# Patient Record
Sex: FEMALE | Race: WHITE | Employment: FULL TIME | URBAN - METROPOLITAN AREA
[De-identification: names, ages, dates, MRNs, and addresses within clinical notes are randomized per-mention and may not be internally consistent; named-entity substitution may affect disease eponyms.]

---

## 2021-09-09 ENCOUNTER — HOSPITAL ENCOUNTER (EMERGENCY)
Facility: CLINIC | Age: 41
Discharge: HOME | End: 2021-09-09
Attending: SPECIALIST

## 2021-09-09 ENCOUNTER — APPOINTMENT (OUTPATIENT)
Dept: CT IMAGING | Facility: CLINIC | Age: 41
End: 2021-09-09

## 2021-09-09 VITALS
BODY MASS INDEX: 18.94 KG/M2 | DIASTOLIC BLOOD PRESSURE: 71 MMHG | HEIGHT: 68 IN | RESPIRATION RATE: 16 BRPM | WEIGHT: 125 LBS | TEMPERATURE: 98.5 F | HEART RATE: 71 BPM | SYSTOLIC BLOOD PRESSURE: 123 MMHG | OXYGEN SATURATION: 99 %

## 2021-09-09 DIAGNOSIS — E87.6 HYPOKALEMIA: ICD-10-CM

## 2021-09-09 DIAGNOSIS — R10.31 RIGHT INGUINAL PAIN: Primary | ICD-10-CM

## 2021-09-09 LAB
ABSOLUTE EOS #: 0.1 K/UL (ref 0–0.4)
ABSOLUTE IMMATURE GRANULOCYTE: ABNORMAL K/UL (ref 0–0.3)
ABSOLUTE LYMPH #: 3.5 K/UL (ref 1–4.8)
ABSOLUTE MONO #: 0.9 K/UL (ref 0.1–1.2)
ALBUMIN SERPL-MCNC: 4.9 G/DL (ref 3.5–5.2)
ALBUMIN/GLOBULIN RATIO: 2 (ref 1–2.5)
ALP BLD-CCNC: 55 U/L (ref 35–104)
ALT SERPL-CCNC: 17 U/L (ref 5–33)
ANION GAP SERPL CALCULATED.3IONS-SCNC: 11 MMOL/L (ref 9–17)
AST SERPL-CCNC: 23 U/L
BASOPHILS # BLD: 0 % (ref 0–2)
BASOPHILS ABSOLUTE: 0 K/UL (ref 0–0.2)
BILIRUB SERPL-MCNC: 0.5 MG/DL (ref 0.3–1.2)
BILIRUBIN URINE: NEGATIVE
BUN BLDV-MCNC: 11 MG/DL (ref 6–20)
BUN/CREAT BLD: ABNORMAL (ref 9–20)
CALCIUM SERPL-MCNC: 9.9 MG/DL (ref 8.6–10.4)
CHLORIDE BLD-SCNC: 103 MMOL/L (ref 98–107)
CO2: 28 MMOL/L (ref 20–31)
COLOR: YELLOW
COMMENT UA: NORMAL
CREAT SERPL-MCNC: 0.6 MG/DL (ref 0.5–0.9)
DIFFERENTIAL TYPE: ABNORMAL
EOSINOPHILS RELATIVE PERCENT: 1 % (ref 1–4)
GFR AFRICAN AMERICAN: >60 ML/MIN
GFR NON-AFRICAN AMERICAN: >60 ML/MIN
GFR SERPL CREATININE-BSD FRML MDRD: ABNORMAL ML/MIN/{1.73_M2}
GFR SERPL CREATININE-BSD FRML MDRD: ABNORMAL ML/MIN/{1.73_M2}
GLUCOSE BLD-MCNC: 104 MG/DL (ref 70–99)
GLUCOSE URINE: NEGATIVE
HCT VFR BLD CALC: 38.1 % (ref 36–46)
HEMOGLOBIN: 13.1 G/DL (ref 12–16)
IMMATURE GRANULOCYTES: ABNORMAL %
KETONES, URINE: NEGATIVE
LEUKOCYTE ESTERASE, URINE: NEGATIVE
LYMPHOCYTES # BLD: 46 % (ref 24–44)
MAGNESIUM: 2.1 MG/DL (ref 1.6–2.6)
MCH RBC QN AUTO: 33.3 PG (ref 26–34)
MCHC RBC AUTO-ENTMCNC: 34.3 G/DL (ref 31–37)
MCV RBC AUTO: 97 FL (ref 80–100)
MONOCYTES # BLD: 11 % (ref 2–11)
NITRITE, URINE: NEGATIVE
NRBC AUTOMATED: ABNORMAL PER 100 WBC
PDW BLD-RTO: 13.3 % (ref 12.5–15.4)
PH UA: 6.5 (ref 5–8)
PLATELET # BLD: 336 K/UL (ref 140–450)
PLATELET ESTIMATE: ABNORMAL
PMV BLD AUTO: 6.8 FL (ref 6–12)
POTASSIUM SERPL-SCNC: 3.5 MMOL/L (ref 3.7–5.3)
PROTEIN UA: NEGATIVE
RBC # BLD: 3.93 M/UL (ref 4–5.2)
RBC # BLD: ABNORMAL 10*6/UL
SEG NEUTROPHILS: 42 % (ref 36–66)
SEGMENTED NEUTROPHILS ABSOLUTE COUNT: 3.3 K/UL (ref 1.8–7.7)
SODIUM BLD-SCNC: 142 MMOL/L (ref 135–144)
SPECIFIC GRAVITY UA: 1.02 (ref 1–1.03)
TOTAL PROTEIN: 7.4 G/DL (ref 6.4–8.3)
TURBIDITY: CLEAR
URINE HGB: NEGATIVE
UROBILINOGEN, URINE: NORMAL
WBC # BLD: 7.8 K/UL (ref 3.5–11)
WBC # BLD: ABNORMAL 10*3/UL

## 2021-09-09 PROCEDURE — 85025 COMPLETE CBC W/AUTO DIFF WBC: CPT

## 2021-09-09 PROCEDURE — 6370000000 HC RX 637 (ALT 250 FOR IP)

## 2021-09-09 PROCEDURE — 36415 COLL VENOUS BLD VENIPUNCTURE: CPT

## 2021-09-09 PROCEDURE — 80053 COMPREHEN METABOLIC PANEL: CPT

## 2021-09-09 PROCEDURE — 83735 ASSAY OF MAGNESIUM: CPT

## 2021-09-09 PROCEDURE — 6360000004 HC RX CONTRAST MEDICATION: Performed by: SPECIALIST

## 2021-09-09 PROCEDURE — 74177 CT ABD & PELVIS W/CONTRAST: CPT

## 2021-09-09 PROCEDURE — 2580000003 HC RX 258: Performed by: SPECIALIST

## 2021-09-09 PROCEDURE — 99285 EMERGENCY DEPT VISIT HI MDM: CPT

## 2021-09-09 PROCEDURE — 81003 URINALYSIS AUTO W/O SCOPE: CPT

## 2021-09-09 RX ORDER — 0.9 % SODIUM CHLORIDE 0.9 %
1000 INTRAVENOUS SOLUTION INTRAVENOUS ONCE
Status: COMPLETED | OUTPATIENT
Start: 2021-09-09 | End: 2021-09-09

## 2021-09-09 RX ORDER — POTASSIUM CHLORIDE 20 MEQ/1
TABLET, EXTENDED RELEASE ORAL
Status: COMPLETED
Start: 2021-09-09 | End: 2021-09-09

## 2021-09-09 RX ORDER — SODIUM CHLORIDE 0.9 % (FLUSH) 0.9 %
10 SYRINGE (ML) INJECTION PRN
Status: DISCONTINUED | OUTPATIENT
Start: 2021-09-09 | End: 2021-09-10 | Stop reason: HOSPADM

## 2021-09-09 RX ORDER — 0.9 % SODIUM CHLORIDE 0.9 %
70 INTRAVENOUS SOLUTION INTRAVENOUS ONCE
Status: COMPLETED | OUTPATIENT
Start: 2021-09-09 | End: 2021-09-09

## 2021-09-09 RX ORDER — POTASSIUM CHLORIDE 20 MEQ/1
20 TABLET, EXTENDED RELEASE ORAL ONCE
Status: COMPLETED | OUTPATIENT
Start: 2021-09-09 | End: 2021-09-09

## 2021-09-09 RX ADMIN — Medication 10 ML: at 22:57

## 2021-09-09 RX ADMIN — POTASSIUM CHLORIDE 20 MEQ: 1500 TABLET, EXTENDED RELEASE ORAL at 23:38

## 2021-09-09 RX ADMIN — SODIUM CHLORIDE 70 ML: 9 INJECTION, SOLUTION INTRAVENOUS at 22:55

## 2021-09-09 RX ADMIN — POTASSIUM CHLORIDE 20 MEQ: 20 TABLET, EXTENDED RELEASE ORAL at 23:38

## 2021-09-09 RX ADMIN — IOPAMIDOL 75 ML: 755 INJECTION, SOLUTION INTRAVENOUS at 22:56

## 2021-09-09 RX ADMIN — SODIUM CHLORIDE 1000 ML: 9 INJECTION, SOLUTION INTRAVENOUS at 20:33

## 2021-09-09 ASSESSMENT — PAIN DESCRIPTION - ONSET: ONSET: SUDDEN

## 2021-09-09 ASSESSMENT — PAIN DESCRIPTION - LOCATION: LOCATION: GROIN

## 2021-09-09 ASSESSMENT — PAIN DESCRIPTION - PROGRESSION: CLINICAL_PROGRESSION: NOT CHANGED

## 2021-09-09 ASSESSMENT — PAIN SCALES - GENERAL: PAINLEVEL_OUTOF10: 8

## 2021-09-09 ASSESSMENT — PAIN DESCRIPTION - FREQUENCY: FREQUENCY: CONTINUOUS

## 2021-09-10 NOTE — ED NOTES
Mode of arrival (squad #, walk in, police, etc) : walk in with cousin        Chief complaint(s): groin pain        Arrival Note (brief scenario, treatment PTA, etc). : Pt reports ongoing groin pain, pt reports that this pain is in her right sided groin. Pt reports that she can feel a lump on the right side of her groin. Pt denies vaginal bleeding or trouble urinating. C= \"Have you ever felt that you should Cut down on your drinking? \"  No  A= \"Have people Annoyed you by criticizing your drinking? \"  No  G= \"Have you ever felt bad or Guilty about your drinking? \"  No  E= \"Have you ever had a drink as an Eye-opener first thing in the morning to steady your nerves or to help a hangover? \"  No      Deferred []      Reason for deferring: N/A    *If yes to two or more: probable alcohol abuse. Osei Kong RN  09/09/21 9431

## 2021-09-10 NOTE — ED PROVIDER NOTES
[facility]  [facility]  Phone: [phone]      Pt Name: Swetha Yanes  MRN: [id number]  [place] 1980  Date of evaluation: 9/9/2021      CHIEF COMPLAINT       Chief Complaint   Patient presents with    Groin Pain     Unknown if injured. Started yesterday. States she does feel a small marble sized bump. HISTORY OF PRESENT ILLNESS    Swetha Yanes is a 37 y.o. female who presents   Chief Complaint   Patient presents with    Groin Pain     Unknown if injured. Started yesterday. States she does feel a small marble sized bump. .    71-year-old female patient presents to the emergency department accompanied by her cousin for evaluation of right groin pain off and on for last 5 months, worse since last night. Patient states that she has felt a small marble size lump in the right groin and she is concerned about hernia. 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 She has history of endometriosis and has undergone several pelvic surgeries and finally total abdominal hysterectomy with bilateral salpingo-oophorectomy about 14 years ago. She also has had appendectomy in the past.  She has had nausea and vomiting off and on for last 2weeks with last episode of vomiting 4 to 5 days ago. She denies any constipation or diarrhea and last bowel movement was 2 days ago. She denies any urinary frequency, urgency, dysuria or hematuria and denies any abnormal vaginal discharge. She grades pain as 8 out of 10 in intensity. Pain is worse with the movements and better with rest.    REVIEW OF SYSTEMS       Review of Systems    All systems reviewed and negative unless noted in HPI. The patient denies fever or constitutional symptoms. Denies vision change. Denies any sore throat or rhinorrhea. Denies any neck pain or stiffness. Denies chest pain or shortness of breath. Denies any dysuria.   Denies urinary frequency or hematuria. Denies musculoskeletal injury or pain. Denies any weakness, numbness or focal neurologic deficit. Denies any skin rash or edema. No recent psychiatric issues. No easy bruising or bleeding. Denies any polyuria, polydypsia       PAST MEDICAL HISTORY    has a past medical history of Abscess, Ectopic pregnancy, Endometritis, History of broken nose, and MRSA (methicillin resistant staph aureus) culture positive. SURGICAL HISTORY      has a past surgical history that includes Hysterectomy, total abdominal; Ovary removal (Bilateral); Cervix removal; and Breast lumpectomy (Right). CURRENT MEDICATIONS       Discharge Medication List as of 9/9/2021 11:30 PM      CONTINUE these medications which have NOT CHANGED    Details   fexofenadine (ALLEGRA ALLERGY) 180 MG tablet Take 180 mg by mouth as needed             ALLERGIES     is allergic to bactrim [sulfamethoxazole-trimethoprim] and codeine. FAMILY HISTORY     has no family status information on file. family history is not on file. SOCIAL HISTORY      reports that she has been smoking cigarettes. She has a 20.00 pack-year smoking history. She has never used smokeless tobacco. She reports current alcohol use. She reports previous drug use. Drug: Marijuana. PHYSICAL EXAM     INITIAL VITALS:  height is 5' 8\" (1.727 m) and weight is 56.7 kg (125 lb). Her oral temperature is 98.5 °F (36.9 °C). Her blood pressure is 123/71 and her pulse is 71. Her respiration is 16 and oxygen saturation is 99%. Physical Exam  Vitals and nursing note reviewed. Constitutional:       Appearance: She is well-developed. HENT:      Head: Normocephalic and atraumatic. Nose: Nose normal.   Eyes:      Extraocular Movements: Extraocular movements intact. Pupils: Pupils are equal, round, and reactive to light. Cardiovascular:      Rate and Rhythm: Normal rate and regular rhythm. Heart sounds: Normal heart sounds. No murmur heard. Pulmonary:      Effort: Pulmonary effort is normal. No respiratory distress. Breath sounds: Normal breath sounds. Abdominal:      General: Bowel sounds are normal. There is no distension. Palpations: Abdomen is soft. Tenderness: There is no abdominal tenderness. Hernia: No hernia is present. Comments: Patient has vague tenderness in the right inguinal region. I do not feel any definite inguinal hernia and there is no evidence of lymphadenopathy. Musculoskeletal:      Cervical back: Normal range of motion and neck supple. Skin:     General: Skin is warm and dry. Neurological:      General: No focal deficit present. Mental Status: She is alert and oriented to person, place, and time. DIFFERENTIAL DIAGNOSIS/ MDM:     Right inguinal strain, hernia, bowel obstruction, osteoarthritis    DIAGNOSTIC RESULTS     EKG: All EKG's are interpreted by the Emergency Department Physician who either signs or Co-signs this chart in the absence of a cardiologist.    None obtained    RADIOLOGY:   I reviewed the radiologist interpretations:  CT ABDOMEN PELVIS W IV CONTRAST Additional Contrast? None   Final Result   1. No evidence of obstructive uropathy. 2. No evidence of acute appendicitis. 3. Unremarkable contrast enhanced CT abdomen and pelvis examination. CT ABDOMEN PELVIS W IV CONTRAST Additional Contrast? None    Result Date: 9/9/2021  EXAMINATION: CT OF THE ABDOMEN AND PELVIS WITH CONTRAST 9/9/2021 8:46 pm TECHNIQUE: CT of the abdomen and pelvis was performed with the administration of intravenous contrast. Multiplanar reformatted images are provided for review. Dose modulation, iterative reconstruction, and/or weight based adjustment of the mA/kV was utilized to reduce the radiation dose to as low as reasonably achievable. COMPARISON: None.  HISTORY: ORDERING SYSTEM PROVIDED HISTORY: Right groin pain, nausea, vomiting TECHNOLOGIST PROVIDED HISTORY: Right groin pain, nausea, vomiting Decision Support Exception - unselect if not a suspected or confirmed emergency medical condition->Emergency Medical Condition (MA) Reason for Exam: Right groin pain, nausea, vomiting Acuity: Acute Type of Exam: Initial FINDINGS: Abdomen/Pelvis: Lower chest: The lung bases are well aerated. Pleural surfaces are unremarkable and no evidence of pleural effusion is identified. Organs: The liver, gallbladder, spleen, pancreas, adrenal glands, kidneys, are unremarkable in appearance. GI/Bowel: The stomach is unremarkable without wall thickening or distention. Bowel loops are unremarkable in appearance without evidence of obstruction, distension or mucosal thickening. The appendix is not clearly visualized and no evidence of acute appendicitis is seen. Pelvis: The urinary bladder is well distended and unremarkable in appearance. No evidence of pelvic free fluid is seen. Peritoneum/Retroperitoneum: No evidence of retroperitoneal or intraperitoneal lymphadenopathy is identified. No evidence of intraperitoneal free fluid is seen. Bones/Soft Tissues: The bones, skeletal muscle bundles, fascial planes and subcutaneous soft tissues are unremarkable in appearance. 1. No evidence of obstructive uropathy. 2. No evidence of acute appendicitis. 3. Unremarkable contrast enhanced CT abdomen and pelvis examination.            LABS:  Labs Reviewed   CBC WITH AUTO DIFFERENTIAL - Abnormal; Notable for the following components:       Result Value    RBC 3.93 (*)     Lymphocytes 46 (*)     All other components within normal limits   COMPREHENSIVE METABOLIC PANEL W/ REFLEX TO MG FOR LOW K - Abnormal; Notable for the following components:    Glucose 104 (*)     Potassium 3.5 (*)     All other components within normal limits   URINE RT REFLEX TO CULTURE   MAGNESIUM       Potassium is 3.5, rest of the lab work is unremarkable    EMERGENCY DEPARTMENT COURSE:   Vitals:    Vitals:    09/09/21 1855   BP: 123/71   Pulse: 71   Resp: 16   Temp: 98.5 °F (36.9 °C)   TempSrc: Oral   SpO2: 99%   Weight: 56.7 kg (125 lb)   Height: 5' 8\" (1.727 m)     -------------------------  BP: 123/71, Temp: 98.5 °F (36.9 °C), Pulse: 71, Resp: 16    Orders Placed This Encounter   Medications    0.9 % sodium chloride bolus    0.9 % sodium chloride bolus    DISCONTD: sodium chloride flush 0.9 % injection 10 mL    iopamidol (ISOVUE-370) 76 % injection 75 mL    potassium chloride (KLOR-CON M) extended release tablet 20 mEq    potassium chloride (KLOR-CON M) 20 MEQ extended release tablet     Freeportvalencia Rocha, [facility]: cabinet override       During the emergency department course, patient was given normal saline bolus followed by infusion. She declined any pain medications. She was given potassium chloride 20 mEq orally and then discharged home with instructions drink plenty of oral fluids, take Tylenol and/or ibuprofen as needed for the pain, follow-up with PCP, return if worse. I have reviewed the disposition diagnosis with the patient and or their family/guardian. I have answered their questions and given discharge instructions. They voiced understanding of these instructions and did not have any further questions or complaints. Re-evaluation Notes    Patient is resting comfortably and does not appear to be in any pain or distress. PROCEDURES:  None    FINAL IMPRESSION      1. Right inguinal pain    2.  Hypokalemia          DISPOSITION/PLAN   DISPOSITION Decision To Discharge 09/09/2021 11:29:51 PM      Condition on Disposition    Stable    PATIENT REFERRED TO:  Martin CURIEL [address] [place] [address]  [phone]    Call in 2 days  For reevaluation of current symptoms    [facility]  [address]  [phone]    If symptoms worsen      DISCHARGE MEDICATIONS:  Discharge Medication List as of 9/9/2021 11:30 PM          (Please note that portions of this note were completed with a voice recognition program.  Efforts were made to edit the dictations but occasionally words are mis-transcribed.)    Jose Manuel White MD,, MD, F.A.C.E.P.   Attending Emergency Physician     Jose Manuel White MD  09/10/21 4254

## 2022-11-13 ENCOUNTER — HOSPITAL ENCOUNTER (EMERGENCY)
Facility: CLINIC | Age: 42
Discharge: HOME | End: 2022-11-13
Attending: EMERGENCY MEDICINE

## 2022-11-13 ENCOUNTER — APPOINTMENT (OUTPATIENT)
Dept: GENERAL RADIOLOGY | Facility: CLINIC | Age: 42
End: 2022-11-13

## 2022-11-13 VITALS
HEIGHT: 68 IN | OXYGEN SATURATION: 98 % | TEMPERATURE: 97.5 F | BODY MASS INDEX: 18.94 KG/M2 | WEIGHT: 125 LBS | HEART RATE: 79 BPM | SYSTOLIC BLOOD PRESSURE: 121 MMHG | DIASTOLIC BLOOD PRESSURE: 80 MMHG | RESPIRATION RATE: 15 BRPM

## 2022-11-13 DIAGNOSIS — S80.02XA CONTUSION OF LEFT KNEE, INITIAL ENCOUNTER: Primary | ICD-10-CM

## 2022-11-13 DIAGNOSIS — M70.42 PREPATELLAR BURSITIS OF LEFT KNEE: ICD-10-CM

## 2022-11-13 PROCEDURE — 73562 X-RAY EXAM OF KNEE 3: CPT

## 2022-11-13 PROCEDURE — 99283 EMERGENCY DEPT VISIT LOW MDM: CPT

## 2022-11-13 ASSESSMENT — PAIN DESCRIPTION - FREQUENCY: FREQUENCY: CONTINUOUS

## 2022-11-13 ASSESSMENT — PAIN SCALES - GENERAL: PAINLEVEL_OUTOF10: 6

## 2022-11-13 ASSESSMENT — PAIN DESCRIPTION - ORIENTATION: ORIENTATION: LEFT

## 2022-11-13 ASSESSMENT — PAIN - FUNCTIONAL ASSESSMENT: PAIN_FUNCTIONAL_ASSESSMENT: 0-10

## 2022-11-13 ASSESSMENT — PAIN DESCRIPTION - LOCATION: LOCATION: KNEE

## 2022-11-13 ASSESSMENT — PAIN DESCRIPTION - DESCRIPTORS: DESCRIPTORS: SHARP;ACHING

## 2022-11-13 ASSESSMENT — PAIN DESCRIPTION - PAIN TYPE: TYPE: ACUTE PAIN

## 2022-11-14 NOTE — ED NOTES
Pt presents to the ED via private auto. Pt states she struck her left knee on the door frame 4 days prior.  Pt c/o pain and swelling initially, pain only now     Kareen Garcia RN  11/13/22 2122

## 2022-11-14 NOTE — ED PROVIDER NOTES
[facility]  [facility]  Phone: [phone]      Pt Name: Jus Castellanos  FXA:[id number]  Birthdate 1980  Date of evaluation: 11/13/2022      CHIEF COMPLAINT       Chief Complaint   Patient presents with    Knee Pain     left       HISTORY OF PRESENT ILLNESS   Jus Castellanos is a 40 y.o. female who presents for evaluation of left knee pain. The patient reports that 4 days ago she was stepping over a baby gate when she accidentally smashed her left knee into the corner of the wall. She developed sudden onset of sharp, stabbing, pain to her left anterior knee with dull, achy pain radiating to her left thigh and left lower leg. She states that she has had intermittent swelling to her left anterior knee. She has been taking ibuprofen and applying ice with minimal relief. The patient states that her pain is worse with ambulation but she is able to walk with a limp. She has been wearing a knee brace with some relief. The patient does not list any other provoking or palliating factors. She states that several years ago she had a left foot fracture and had a fall. She had developed left knee swelling at that time and was diagnosed with a left knee joint effusion with possible occult patella fracture. The patient states that at that time she did not require surgery but did follow-up with a orthopedic surgeon at [facility]. She does not remember his name. The patient denies any surgery to the left knee. The patient denies fever, chills, headache, vision changes, neck pain, back pain, chest pain, shortness of breath, abdominal pain, vomiting, urinary/bowel symptoms, focal weakness, numbness, tingling, recent illness.     REVIEW OF SYSTEMS     Ten point review of systems was reviewed and is negative unless otherwise noted in the HPI    [address]    has a past medical history of Abscess, Ectopic pregnancy, Endometritis, History of broken nose, and MRSA (methicillin resistant staph aureus) culture positive. SURGICAL HISTORY      has a past surgical history that includes Hysterectomy, total abdominal; Ovary removal (Bilateral); Cervix removal; and Breast lumpectomy (Right). CURRENT MEDICATIONS       Discharge Medication List as of 11/13/2022 10:10 PM        CONTINUE these medications which have NOT CHANGED    Details   fexofenadine (ALLEGRA ALLERGY) 180 MG tablet Take 180 mg by mouth as needed             ALLERGIES     is allergic to bactrim [sulfamethoxazole-trimethoprim] and codeine. FAMILY HISTORY     has no family status information on file. family history is not on file. SOCIAL HISTORY      reports that she has been smoking cigarettes. She has a 20.00 pack-year smoking history. She has never used smokeless tobacco. She reports current alcohol use. She reports that she does not currently use drugs after having used the following drugs: Marijuana Seabron Barban). PHYSICAL EXAM     INITIAL VITALS:  height is 5' 8\" (1.727 m) and weight is 56.7 kg (125 lb). Her oral temperature is 97.5 °F (36.4 °C). Her blood pressure is 121/80 and her pulse is 79. Her respiration is 15 and oxygen saturation is 98%. CONSTITUTIONAL: no apparent distress, well appearing  SKIN: warm, dry, no jaundice, hives or petechiae  EYES: clear conjunctiva, non-icteric sclera  HENT: normocephalic, atraumatic, moist mucus membranes  NECK: Nontender and supple with no nuchal rigidity, full range of motion  PULMONARY: clear to auscultation without wheezes, rhonchi, or rales, normal excursion, no accessory muscle use and no stridor  CARDIOVASCULAR: regular rate, rhythm. Strong radial pulses with intact distal perfusion. Capillary refill <2 seconds.   GASTROINTESTINAL: soft, non-tender, non-distended, no palpable masses, no rebound or guarding   GENITOURINARY: No costovertebral angle tenderness to palpation  MUSCULOSKELETAL: Tenderness palpation and prepatellar swelling to the left anterior knee without any overlying skin changes, erythema, or ecchymosis. Limited range of motion of the left knee secondary to pain. No ligamentous laxity with medial or lateral strain of the knee bilaterally. Negative anterior and posterior drawer test bilaterally. Negative Lachman's test bilaterally. No palpable Baker cyst.  There is mild pain with manipulation of the left patella. No pain at the hips or ankles. Normal Chavez test.  No pain over the proximal fibular head or the base of the fifth metatarsal. Compartments soft. Strength 5/5 with knee/hip extension and flexion bilaterally. Strength 5/5 with plantar/dorsiflexion of the bilateral feet. DP/PT/Popliteal pulses 2+/4 and equal bilaterally. No midline spinal tenderness, step off or deformity. Extremities are otherwise nontender to palpation and nonerythematous. Compartments soft. No peripheral edema. NEUROLOGIC: alert and oriented x 3, GCS 15, normal mentation and speech. Moves all extremities x 4 without motor or sensory deficit, gait is stable with a slight limp. PSYCHIATRIC: normal mood and affect, thought process is clear and linear    DIAGNOSTIC RESULTS     EKG:  None    RADIOLOGY:   XR KNEE LEFT (3 VIEWS)    Result Date: 11/13/2022  EXAMINATION: FOUR XRAY VIEWS OF THE LEFT KNEE 11/13/2022 9:29 pm COMPARISON: Left knee 01/07/2014 HISTORY: ORDERING SYSTEM PROVIDED HISTORY: left knee pain after striking against corner of wall 4 days ago TECHNOLOGIST PROVIDED HISTORY: left knee pain after striking against corner of wall 4 days ago Reason for Exam: left knee pain after striking against corner of wall 4 days ago FINDINGS: 5 images are presented. Osseous structures of the knee are intact and align normally. No retained radiopaque foreign body. Joint spaces appear well maintained. No acute osseous abnormality evident. Follow-up or additional imaging should be considered if pain persists or worsens.         LABS:  No results found for this visit on 11/13/22. EMERGENCY DEPARTMENT COURSE:        The patient was given the following medications:  No orders of the defined types were placed in this encounter. Vitals:    Vitals:    11/13/22 2119   BP: 121/80   Pulse: 79   Resp: 15   Temp: 97.5 °F (36.4 °C)   TempSrc: Oral   SpO2: 98%   Weight: 56.7 kg (125 lb)   Height: 5' 8\" (1.727 m)     -------------------------  BP: 121/80, Temp: 97.5 °F (36.4 °C), Heart Rate: 79, Resp: 15    CONSULTS:  None    CRITICAL CARE:   None    PROCEDURES:  Splint Application    Date/Time: 11/13/2022 10:32 PM  Performed by: Torie Pena DO  Authorized by: Torie Pean DO     Consent:     Consent obtained:  Verbal    Consent given by:  Patient    Risks, benefits, and alternatives were discussed: yes      Risks discussed:  Discoloration, numbness, pain and swelling    Alternatives discussed:  No treatment, delayed treatment, alternative treatment, observation and referral  Universal protocol:     Procedure explained and questions answered to patient or proxy's satisfaction: yes      Imaging studies available: yes      Patient identity confirmed:  Arm band  Pre-procedure details:     Distal neurologic exam:  Normal    Distal perfusion: distal pulses strong and brisk capillary refill    Procedure details:     Location:  Knee    Knee location:  L knee    Strapping: no      Lower extremity splint type: hinged knee brace. Attestation: Splint applied and adjusted personally by me    Post-procedure details:     Distal neurologic exam:  Normal    Distal perfusion: distal pulses strong and brisk capillary refill      Procedure completion:  Tolerated well, no immediate complications      DIAGNOSIS/ MDM:   Estefanía Masters is a 40 y.o. female who presents with left knee pain. Vital signs are stable. She is neurovascularly intact. The patient has tenderness palpation and prepatellar swelling to the left anterior knee. There is decreased range of motion secondary to pain.   No ligamentous laxity on exam.  X-ray of the left knee shows no acute osseous abnormality. I have low suspicion for fracture or dislocation. I suspect she has prepatellar bursitis and possible soft tissue injury of the left knee. She was placed in a hinged knee brace for comfort. I instructed her to take ibuprofen or Tylenol as needed for pain. She declined any other pain medications. She was instructed to apply ice packs for 20 minutes at a time and elevate her left leg is much as possible. I made her an appointment using 1 click with orthopedic surgery for Wednesday at 10:30 AM based on her choice. I instructed her to follow-up as scheduled and to return to the ER for worsening symptoms or any other concern. The patient understands that at this time there is no evidence for a more malignant underlying process, but also understands that early in the process of an illness or injury, an emergency department work-up can be falsely reassuring. Routine discharge counseling was given, and the patient understands that worsening, changing or persistent symptoms should prompt a immediate call or follow-up with their primary care physician or return to the emergency department. The importance of appropriate follow-up was also discussed. I have reviewed the disposition diagnosis with the patient. I have answered their questions and given discharge instructions. They voiced understanding of these instructions and did not have any further questions or complaints. FINAL IMPRESSION      1. Contusion of left knee, initial encounter    2.  Prepatellar bursitis of left knee          DISPOSITION/PLAN   DISPOSITION Decision To Discharge 11/13/2022 10:00:30 PM        PATIENT REFERRED TO:  Aparna Landers  [address] [facility]  [phone]    Schedule an appointment as soon as possible for a visit in 2 days      [facility]  [address] [place] [address],1 [id number] [phone]  Go to   If symptoms worsen Boykin Burkitt, [address] [place] [address]  [phone]    Go on 11/16/2022  at 10:30am    DISCHARGE MEDICATIONS:  Discharge Medication List as of 11/13/2022 10:10 PM          (Please note that portions of this note were completed with a voice recognitionprogram.  Efforts were made to edit the dictations but occasionally words are mis-transcribed.)    Laurita Horowitz DO, [facility]  Emergency Physician Attending          Laurita Horowitz DO  11/13/22 2829

## 2022-11-16 ENCOUNTER — OFFICE VISIT (OUTPATIENT)
Dept: ORTHOPEDIC SURGERY | Age: 42
End: 2022-11-16

## 2022-11-16 VITALS — WEIGHT: 125 LBS | BODY MASS INDEX: 18.94 KG/M2 | RESPIRATION RATE: 12 BRPM | HEIGHT: 68 IN

## 2022-11-16 DIAGNOSIS — S80.02XA CONTUSION OF LEFT PATELLA, INITIAL ENCOUNTER: Primary | ICD-10-CM

## 2022-11-16 PROCEDURE — 99203 OFFICE O/P NEW LOW 30 MIN: CPT | Performed by: PHYSICIAN ASSISTANT

## 2022-11-16 NOTE — PROGRESS NOTES
[address] AND SPORTS MEDICINE  [address] [facility] [address]  Dept: [phone]  Dept Fax: [phone]          Left Knee - New Patient-ED follow up    Subjective:     Chief Complaint   Patient presents with    ED Follow-up     L Knee Injury, doi 11/9/22       HPI:     Maliha Fierro presents today for Left knee pain. The pain has been present since 11/9/2022. On 11/13/2022 she went to [facility] ED where they did an x-ray and did an ED 1-Click to the orthopedic office. The patient was stepping over a baby gate when she smashed her knee into the corner of the wall. The patient has tried ibuprofen, ice, rest, brace with mild improvement. The pain is now described as  tingling, Achy, and Sharp . There is  pain on weight bearing. The knee has swelled. There is  painful popping and clicking. The knee has not caught or locked up. The knee has not given out. It is  stiff upon arising from sitting. It is  painful to go up and down stairs and sit for a prolonged time. The patient has not had a cortisone injection. The patient has not tried physical therapy. The patient has not had surgery. ROS:   Review of Systems    Past Medical History:    Past Medical History:   Diagnosis Date    Abscess     Ectopic pregnancy     Endometritis     History of broken nose     MRSA (methicillin resistant staph aureus) culture positive        Past Surgical History:    Past Surgical History:   Procedure Laterality Date    BREAST LUMPECTOMY Right     CERVIX REMOVAL      HYSTERECTOMY, TOTAL ABDOMINAL (CERVIX REMOVED)      OVARY REMOVAL Bilateral        CurrentMedications:   Current Outpatient Medications   Medication Sig Dispense Refill    fexofenadine (ALLEGRA ALLERGY) 180 MG tablet Take 180 mg by mouth as needed       No current facility-administered medications for this visit.        Allergies:    Bactrim [sulfamethoxazole-trimethoprim] and Codeine    Social History:   Social History     Socioeconomic History    Marital status: Single     Spouse name: None    Number of children: None    Years of education: None    Highest education level: None   Tobacco Use    Smoking status: Every Day     Packs/day: 1.00     Years: 20.00     Pack years: 20.00     Types: Cigarettes    Smokeless tobacco: Never   Vaping Use    Vaping Use: Never used   Substance and Sexual Activity    Alcohol use: Yes     Alcohol/week: 0.0 standard drinks     Comment:  Socially    Drug use: Not Currently     Types: Marijuana Olivier Vanderbilt)     Comment: Once or twice       Family History:  No family history on file. Vitals:   Resp 12   Ht 5' 8\" (1.727 m)   Wt 125 lb (56.7 kg)   BMI 19.01 kg/m²  Body mass index is 19.01 kg/m². Physical Examination:     Orthopedics:    GENERAL: Alert and oriented X3 in no acute distress. SKIN: Intact without lesions or ulcerations. NEURO: Intact to sensory and motor testing. VASC: Capillary refill is less than 3 seconds. KNEE EXAM    LOCATION: Left Knee  GEN: Alert and oriented X 3, in no acute distress. GAIT: The patient's gait was observed while entering the exam room and was noted to be antalgic. The extremity is in anatomic alignment. SKIN: Intact without rashes, lesions, or ulcerations. No obvious deformity or swelling. NEURO: The patient responds to light touch throughout bilateral LE. Patellar and Achilles reflexes are 2/4. VASC: The bilateral LE is neurovascularly intact with 2/4 DP and 2/4 PT pulses. Brisk capillary refill. ROM: 0/127 degrees. There is no effusion. MUSC: slightly decreased quad tone  LIGAMENT: Lachman's test is Negative with Good endpoint. Anterior drawer Negative. Posterior drawer Negative. There is No varus instability at 0 degrees and No varus instability at 30 degrees. There is No valgus instability at 0 degrees and No valgus instability at 30 degrees.   SPECIAL: Dustin test is negative with no clunks, + crepitation, and + pain. PALP: There is no joint line pain. Patella pain to palpation. Assessment:     1. Contusion of left patella, initial encounter      Procedures:    Procedure: no  Radiology:   XR KNEE LEFT (3 VIEWS)    Result Date: 11/13/2022  EXAMINATION: FOUR XRAY VIEWS OF THE LEFT KNEE 11/13/2022 9:29 pm COMPARISON: Left knee 01/07/2014 HISTORY: ORDERING SYSTEM PROVIDED HISTORY: left knee pain after striking against corner of wall 4 days ago TECHNOLOGIST PROVIDED HISTORY: left knee pain after striking against corner of wall 4 days ago Reason for Exam: left knee pain after striking against corner of wall 4 days ago FINDINGS: 5 images are presented. Osseous structures of the knee are intact and align normally. No retained radiopaque foreign body. Joint spaces appear well maintained. No acute osseous abnormality evident. Follow-up or additional imaging should be considered if pain persists or worsens. Plan:   Treatment : I reviewed the x-ray with the patient and I informed them that the x-ray was negative for any bony abnormalities. We discussed the etiologies and natural histories of patella contusion. We discussed the various treatment alternatives including anti-inflammatory medications, physical therapy, injections, further imaging studies and as a last result surgery. During today's visit, we discussed the patella contusions can be very painful. She does not have a break. It can take several weeks and even months till she can kneel or squat without discomfort. I would like her to follow the NSAID protocol and we will give her a Tubigrip for some compression. I do not think she needs a significant brace. The  patient has opted for going to physical therapy. A physical therapy prescription was given. Patient should return to the clinic in 6 weeks to follow up with Karyl Will PA-C. The patient will call the office immediately with any problems.       No orders of the defined types were placed in this encounter. Orders Placed This Encounter   Procedures    Ambulatory referral to Physical Therapy     Referral Priority:   Routine     Referral Type:   Eval and Treat     Referral Reason:   Specialty Services Required     Requested Specialty:   Physical Therapist     Number of Visits Requested:   1           This note is created with the assistance of a speech recognition program.  While intending to generate a document that actually reflects the content of the visit, the document can still have some errors including those of syntax and sound a like substitutions which may escape proof reading.   In such instances, actual meaning can be extrapolated by contextual diversion    Electronically signed by Alexandra Iglesias PA-C, on 11/16/2022 at 9:01 AM

## 2022-11-22 ENCOUNTER — HOSPITAL ENCOUNTER (OUTPATIENT)
Dept: PHYSICAL THERAPY | Facility: CLINIC | Age: 42
Setting detail: THERAPIES SERIES
Discharge: HOME | End: 2022-11-22

## 2022-11-22 PROCEDURE — 97161 PT EVAL LOW COMPLEX 20 MIN: CPT

## 2022-11-22 PROCEDURE — 97110 THERAPEUTIC EXERCISES: CPT

## 2022-11-22 NOTE — CONSULTS
[] [address]  [address]  [phone]  F: [phone] [] [address]  [address]   [address]  P: [phone]  F: [phone] [] [place]  [address]  P: [phone]  F: [phone] [x] [address]  P: [phone]  F: [phone] [] [address]  [facility]   [address]   [place]: [phone]  F: [phone]      Physical Therapy Lower Extremity Evaluation    Date:  2022  Patient: Donovan Miranda  : 1980  MRN: [id number]  Physician: Ezra Estrada PA-C     Insurance: Hospital Self Pay  Medical Diagnosis: S80.02XA (ICD-10-CM) - Contusion of left patella, initial encounter    Rehab Codes: Left knee pain M25.562  Onset date: 22  Next Dr's appt.: Pending    Subjective:   CC: left knee pain  HPI: The patient was stepping over a baby gate and hit her left patella on the corner of the wall. She had immediate pain and swelling. She has had limited walking and stair tolerance, her pain s not improving because she has had to continue using it a lot. She is visiting from [place] to care for her elderly grandmother. PMHx: [] Unremarkable [] Diabetes [] HTN  [] Pacemaker   [] MI/Heart Problems [] Cancer [x] Arthritis [] Other:              [] Refer to full medical chart  In EPIC       Comorbidities:   [] Obesity [] Dialysis  [] N/A   [] Asthma/COPD [] Dementia [x] Other: Panic attacks   [] Stroke [] Sleep apnea [] Other:   [] Vascular disease [] Rheumatic disease [] Other:     Tests: [x] X-Ray:   FINDINGS:   5 images are presented. Osseous structures of the knee are intact and align   normally. No retained radiopaque foreign body. Joint spaces appear well   maintained.     [] MRI:  [] Other:    Medications: [x] Refer to full medical record [] None [] Other:  Allergies:      [x] Refer to full medical record [] None [] Other:    Function:  Hand Dominance  [x] Right  [] Left  Patient lives with: In what type of home [x]  One story   [] Two story   [] Split level   Number of stairs to enter 2   With handrail on the [x]  Right to enter   [x] Left to enter   Bathroom has a []  Tub only  [x] Tub/shower combo   [] Walk in shower    []  Grab bars   Washing machine is on [x]  Main level   [] Second level   [] Basement   Employer    Job Status []  Normal duty   [] Light duty   [] Off due to condition    []  Retired   [x] Not employed   [] Disability  [] Other:  []  Return to work:    Work activities/duties        ADL/IADL Previous level of function Current level of function Who currently assists the patient with task   Bathing  [x] Independent  [] Assist [x] Independent  [] Assist    Dress/grooming [x] Independent  [] Assist [x] Independent  [] Assist    Transfer/mobility [x] Independent  [] Assist [x] Independent  [] Assist    Feeding [x] Independent  [] Assist [x] Independent  [] Assist    Toileting [x] Independent  [] Assist [x] Independent  [] Assist    Driving [x] Independent  [] Assist [x] Independent  [] Assist    Housekeeping [x] Independent  [] Assist [x] Independent  [] Assist    Grocery shop/meal prep [x] Independent  [] Assist [x] Independent  [] Assist      Gait Prior level of function Current level of function    [x] Independent  [] Assist [x] Independent  [] Assist   Device: [x] Independent [x] Independent    [] Straight Cane [] Quad cane [] Straight Cane [] Quad cane    [] Standard walker [] Rolling walker   [] 4 wheeled walker [] Standard walker [] Rolling walker   [] 4 wheeled walker    [] Wheelchair [] Wheelchair     Pain:  [x] Yes  [] No Location: left knee pain Pain Rating: (0-10 scale) 3/10  Pain altered Tx:  [] Yes  [x] No  Action:    Symptoms:  [] Improving [] Worsening [x] Same  Better:  [] AM    [] PM    [] Sit    [] Rise/Sit    []Stand    [] Walk    [] Lying    [] Other:  Worse: [] AM    [] PM    [x] Sit    [] Rise/Sit    [x]Stand    [x] Walk    [] Lying    [] Bend                      [] Valsalva    [] Other:  Sleep: [] OK    [x] Disturbed    Objective:    ROM  ° A/P STRENGTH TESTS (+/-) Left Right Not Tested    Left Right Left Right Ant. Drawer   [x]   Hip Flex   4+ 4+ Post. Drawer   [x]   Ext   4 4+ Lachmans   [x]   ER     Valgus Stress - - []   IR     Varus Stress - - []   ABD   4 4+ Dustins - - []   ADD     Apleys Comp.    [x]   Knee Flex 120 135 3+ 5 Apleys Dist.   [x]   Ext 5 0 3+ 5 Hip Scouring   [x]   Ankle DF     FELICIANOs   [x]   PF     Piriformis   [x]   INV     Fabs   [x]   EVER     Talor Tilt   [x]        Pat-Fem Grind   [x]       OBSERVATION No Deficit Deficit Not Tested Comments   Posture       Forward Head [x] [] []    Rounded Shoulders [x] [] []    Kyphosis [x] [] []    Lordosis [x] [] []    Lateral Shift [x] [] []    Scoliosis [] [] [x]    Iliac Crest [] [] [x]    PSIS [] [] [x]    ASIS [] [] [x]    Genu Valgus [x] [] []    Genu Varus [x] [] []    Genu Recurvatum [x] [] []    Pronation [] [] [x]    Supination [] [] [x]    Leg Length Discrp [] [] [x]    Slumped Sitting [] [] [x]    Palpation [] [x] [] TTP patella   Sensation [x] [] []    Edema [] [x] []    Neurological [x] [] []    Patellar Mobility [x] [] []    Patellar Orientation [x] [] []    Gait [] [x] [] Analysis: lacks knee flexion with swing phase and avoids full extension in stance phase         FUNCTION Normal Difficult Unable   Sitting [] [x] []   Standing [] [x] []   Ambulation [] [x] []   Groom/Dress [x] [] []   Lift/Carry [] [x] []   Stairs [] [x] []   Bending [] [x] []   Squat [] [x] []   Kneel [] [] []     BALANCE/PROPRIOCEPTION              [x] Not tested   Single leg stance       R                     L                                PAIN   Eyes open Sec.                  Sec                  . []    Eyes closed                          Sec. Sec                  . []        FUNCTIONAL TESTS PAIN NO PAIN COMMENTS   Step Test 4 [x] []    6 [x] []    8 [x] []    Squat [x] []      Functional Test: LEFS Score: 36/80, 55% functionally impaired       Assessment:  The patient is a 40year old with a chief complaint of left knee pain and signs and symptoms consistent with a diagnosis of left patellar contusion. She presents with pain, weakness, decreased range of motion and functional limitations. She will benefit from skilled PT to address above deficits. Problem list, as detailed above:   [x] ? Pain     [x] ? ROM    [x] ? Strength    [x] ? Function:   [x] ? Balance  [x] Edema  [] Postural Deviations  [x] Gait Deviations  [] Other     STG: (to be met in 5 treatments)  ? Pain: 2/10  ? ROM: 0-135:  Patient to be independent with home exercise program as demonstrated by performance with correct form without cues. LTG: (to be met in 10 treatments)  Patient will ascend and descend stairs without increase in pain. Patient will normalize gait. Patient will resume walking program.                     Patient goals: Reduce knee pain    Rehab Potential:  [x] Good  [] Fair  [] Poor   Suggested Professional Referral:  [x] No  [] Yes:  Barriers to Goal Achievement:  [x] No  [] Yes:  Domestic Concerns:  [x] No  [] Yes:    Pt. Education:  [x] Plans/Goals, Risks/Benefits discussed  [x] Home exercise program    Method of Education: [x] Verbal  [x] Demo  [x] Written  Comprehension of Education:  [x] Verbalizes understanding. [] Demonstrates understanding. [] Needs Review. [] Demonstrates/verbalizes understanding of HEP/Ed previously given.     Treatment Plan:  [x] Therapeutic Exercise   40078  [] Iontophoresis: 4 mg/mL Dexamethasone Sodium Phosphate  mAmin  08006   [] Therapeutic Activity  30588 [x] Vasopneumatic cold with compression  10572    [] Gait Training   88282 [] Ultrasound   [id number]   [x] Neuromuscular Re-education  [id number] [] Electrical Stimulation Unattended  27702   [x] Manual Therapy  66790 [] Electrical Stimulation Attended  [id number]   [x] Instruction in HEP  [] Lumbar/Cervical Traction  [id number]   [] Aquatic Therapy   74531 [] Cold/hotpack    [] Massage   40043      [] Dry Needling, 1 or 2 muscles  60654   [] Biofeedback, first 15 minutes   08509  [] Biofeedback, additional 15 minutes   37685 [] Dry Needling, 3 or more muscles  43472       Frequency:  2 x/week for 10 visits        Todays Treatment:  Modalities:  ice pack with elevation 10'  Precautions:  Exercises:  Exercise Reps/ Time Weight/ Level Comments   Quad set 2x10     SLR 10     Hip abduction 2x10     Prone extension x10     Ball Bridge 10     Other:  Access Code: [email or web address]  URL: [email or web address]  Date: 11/22/2022  Prepared by: Mynor Bond    Exercises  Supine Quad Set - 1 x daily - 7 x weekly - 3 sets - 10 reps  Active Straight Leg Raise with Quad Set - 1 x daily - 7 x weekly - 3 sets - 10 reps  Sidelying Hip Abduction - 1 x daily - 7 x weekly - 3 sets - 10 reps  Prone Hip Extension - 1 x daily - 7 x weekly - 3 sets - 10 reps    Specific Instructions for next treatment:      Evaluation Complexity:  History (Personal factors, comorbidities) [] 0 [x] 1-2 [] 3+   Exam (limitations, restrictions) [] 1-2 [x] 3 [] 4+   Clinical presentation (progression) [x] Stable [] Evolving  [] Unstable   Decision Making [x] Low [] Moderate [] High    [x] Low Complexity [] Moderate Complexity [] High Complexity       Treatment Charges: Mins Units   [x] Evaluation       [x]  Low       []  Moderate       []  High 20 1   []  Modalities     [x]  Ther Exercise 20 1   []  Manual Therapy     []  Ther Activities     []  Aquatics     []  Vasocompression     [x]  Other: Cold 10      TOTAL TREATMENT TIME: 50    Time in:1000   Time Out:1050    Electronically signed by: Mynor Bond, PT        Physician Signature:________________________________Date:__________________  By signing above or cosigning this note, I have reviewed this plan of care and certify a need for medically necessary rehabilitation services.      *PLEASE SIGN ABOVE AND FAX BACK ALL PAGES*

## 2022-11-28 ENCOUNTER — HOSPITAL ENCOUNTER (OUTPATIENT)
Dept: PHYSICAL THERAPY | Facility: CLINIC | Age: 42
Setting detail: THERAPIES SERIES
Discharge: HOME | End: 2022-11-28

## 2022-11-28 PROCEDURE — 97016 VASOPNEUMATIC DEVICE THERAPY: CPT

## 2022-11-28 PROCEDURE — 97110 THERAPEUTIC EXERCISES: CPT

## 2022-12-01 ENCOUNTER — HOSPITAL ENCOUNTER (OUTPATIENT)
Dept: PHYSICAL THERAPY | Facility: CLINIC | Age: 42
Setting detail: THERAPIES SERIES
Discharge: HOME | End: 2022-12-01

## 2022-12-01 PROCEDURE — 97110 THERAPEUTIC EXERCISES: CPT

## 2022-12-01 NOTE — FLOWSHEET NOTE
[] [facility]) - [facility]EL[facility] CENTER &  Therapy  [address]  [phone]  F: [phone] [] [address]  [address]   [address]  P: [phone]  F: [phone] [] [address]  Therapy  [address]  P: [phone]  F: [phone] [x] [address]  P: [phone]  F: [phone] [] [address]  [facility]   [address]   [place]: [phone]  F: [phone]      Physical Therapy Daily Treatment Note    Date:  2022  Patient Name:  Estefanía Masters    :  1980  MRN: [id number]  Physician: Rashmi Lee PA-C                                Insurance: Hospital Self Pay  Medical Diagnosis: S80.02XA (ICD-10-CM) - Contusion of left patella, initial encounter                Rehab Codes: Left knee pain M25.562  Onset date: 22             Next Dr's appt.: Pending    Visit# / total visits: 2/10    Cancels/No Shows: 0/0    Subjective:    Pain:  [x] Yes  [] No  Location: L Knee   Pain Rating: (0-10 scale) 0-1/10  Pain altered Tx:  [x] No  [] Yes  Action:  Comments: Pt reports improving pain and gait. She has still chirag doing stairs one at a time, but is moving faster overall.     Objective:  Modalities:    Precautions:none  Exercises:  Exercise Reps/ Time Weight/ Level Comments    Nustep  6' L2     Bike 7' Seat 3  x   Gastroc str 2x30\"   x   HS str  2x30\"      Heel slides 2x10   x   Ball Bridge 2x15   x   Ball flexion  2x15   x   Ball alt SLR 2x10   x          Prone quad str  3x30\"      Quad set 2x10  3\" hold    x   LAQ 2x10  With slight HS curl     SLR x10     x   Hip abduction x10     x   Hip circles 5/5   x   Prone extension x10     x   Bridges 2x15     x   Gym       Heel prop 1'  Seated in chair, heel on stool    Gait training  1 laps    x   Heel pause 20'   x   Toe pause  20'   x   Marching 2x2-'   x   TKE 2x15 Blue  x   Calf raise 2x15   x   TRX squat 10   x   Lateral band walk 20'   x   Other:  HEP:   Access Code: [email or web address]  URL: [email or web address]  Date: 11/28/2022  Prepared by: Gladis Johnson    Exercises  Prone Quad Stretch with Towel Roll and Strap - 1 x daily - 7 x weekly - 1 sets - 3 reps - 30 sec hold  Supine Quad Set - 1 x daily - 7 x weekly - 2-3 sets - 10 reps  Active Straight Leg Raise with Quad Set - 1 x daily - 7 x weekly - 2-3 sets - 10 reps  Sidelying Hip Abduction - 1 x daily - 7 x weekly - 2-3 sets - 10 reps  Prone Hip Extension - 1 x daily - 7 x weekly - 2-3 sets - 10 reps  Seated Long Arc Quad - 1 x daily - 7 x weekly - 2-3 sets - 10 reps  Supine Heel Slide with Strap - 1 x daily - 7 x weekly - 2-3 sets - 10 reps    Heel prop x1' - verbal instruction    Specific Instructions for next treatment:    Treatment Charges: Mins Units   []  Modalities     [x]  Ther Exercise 48 3   []  Manual Therapy     []  Ther Activities     []  Aquatics     []  Vasocompression     []  Other     Total Treatment time 48 3       Assessment: [x] Progressing toward goals. Initiated tx on bike for ROM with good tolerance. Supine, side lying an prone exercises performed with addition of ball exercises and hip circles. Progressed standing exercises with addition of TKE, step up on 4\" step and TRX squat. Improved gait following drills and VC. Will continue with POC and D/C when goals are met. [] No change. [] Other:  [x] Patient would continue to benefit from skilled physical therapy services in order to: The patient is a 40year old with a chief complaint of left knee pain and signs and symptoms consistent with a diagnosis of left patellar contusion. She presents with pain, weakness, decreased range of motion and functional limitations. She will benefit from skilled PT to address above deficits.     STG/LTG  STG: (to be met in 5 treatments)  ? Pain: 2/10. Met  ? ROM: 0-135:  Patient to be independent with home exercise program as demonstrated by performance with correct form without cues. Met     LTG: (to be met in 10 treatments)  Patient will ascend and descend stairs without increase in pain. Patient will normalize gait. Patient will resume walking program.                 Patient goals: Reduce knee pain    Pt. Education:  [x] Yes  [] No  [x] Reviewed Prior HEP/Ed  Method of Education: [x] Verbal  [] Demo  [x] Written  Comprehension of Education:  [x] Verbalizes understanding. [] Demonstrates understanding. [x] Needs review. [] Demonstrates/verbalizes HEP/Ed previously given. Plan: [x] Continue current frequency toward long and short term goals.     [] Specific Instructions for subsequent treatments:     Frequency:  2 x/week for 10 visits      Time In:10:00 am            Time Out: 10:50 am    Electronically signed by:  Rigo Stark PT

## 2022-12-05 ENCOUNTER — HOSPITAL ENCOUNTER (OUTPATIENT)
Dept: PHYSICAL THERAPY | Facility: CLINIC | Age: 42
Setting detail: THERAPIES SERIES
Discharge: HOME | End: 2022-12-05

## 2022-12-05 PROCEDURE — 97016 VASOPNEUMATIC DEVICE THERAPY: CPT

## 2022-12-05 PROCEDURE — 97110 THERAPEUTIC EXERCISES: CPT

## 2022-12-05 NOTE — FLOWSHEET NOTE
[] [facility]) - [facility] CENTER &  Therapy  [address]  [phone]  F: [phone] [] [address]  [address]   [address]  P: [phone]  F: [phone] [] [place]  [facility]  [address]  P: [phone]  F: [phone] [x] [address]  P: [phone]  F: [phone] [] [address]  [facility]   [address]   [place]: [phone]  F: [phone]      Physical Therapy Daily Treatment Note    Date:  2022  Patient Name:  Raj Black    :  1980  MRN: [id number]  Physician: Rolan Crawley PA-C                                Insurance: Hospital Self Pay  Medical Diagnosis: S80.02XA (ICD-10-CM) - Contusion of left patella, initial encounter                Rehab Codes: Left knee pain M25.562  Onset date: 22             Next Dr's appt.: Pending    Visit# / total visits: 4/10    Cancels/No Shows: 0/0    Subjective:    Pain:  [x] Yes  [] No  Location: L Knee   Pain Rating: (0-10 scale) 2/10  Pain altered Tx:  [x] No  [] Yes  Action:  Comments: Pt reports that she had to use the stairs in a parking garage after a concert over the weekend (22) going 4 stories down and up which caused increased pain and soreness in L knee that night and through the rest of the weekend. Pt arrives with 2/10 soreness, pointing to medial and lateral aspects of L knee.        Objective:  Modalities:    Precautions:none  Exercises:  Exercise Reps/ Time Weight/ Level Comments    Bike 8' Seat 3  x   Gastroc str 2x30\"   x   HS str  2x30\"   x   Heel slides 15x   x   Ball Bridge 2x15   x   Ball flexion  2x15   x   Ball alt SLR 2x10   x          Prone quad str  3x30\"      Prone Quad set 15x  3\" hold    x   LAQ 2x10  With slight HS curl -   SLR x10     -   Hip abduction x10     x   Hip circles 2x5 ea    x   Prone extension x10     x   Bridges 2x15     -   Gym       Heel prop 1'  Seated in chair, heel on stool    Total Gym squats- DL  2x10 L13  x   Gait training  1 laps    x   Heel pause 20'   x   Toe pause  20'   x   Marching 20'   x   TKE 2x15 Blue  -   Calf raise 2x15   x   TRX squat 2x10   x   Lateral band walk 20' Terry   -   Other:   11/22/22 12/5/22   L knee PROM  5-120 0-125; lacking 4 deg ext at rest      HEP:   Access Code: [email or web address]  URL: [email or web address]  Date: 11/28/2022  Prepared by: Gely Andres    Exercises  Prone Quad Stretch with Towel Roll and Strap - 1 x daily - 7 x weekly - 1 sets - 3 reps - 30 sec hold  Supine Quad Set - 1 x daily - 7 x weekly - 2-3 sets - 10 reps  Active Straight Leg Raise with Quad Set - 1 x daily - 7 x weekly - 2-3 sets - 10 reps  Sidelying Hip Abduction - 1 x daily - 7 x weekly - 2-3 sets - 10 reps  Prone Hip Extension - 1 x daily - 7 x weekly - 2-3 sets - 10 reps  Seated Long Arc Quad - 1 x daily - 7 x weekly - 2-3 sets - 10 reps  Supine Heel Slide with Strap - 1 x daily - 7 x weekly - 2-3 sets - 10 reps    Heel prop x1' - verbal instruction    Specific Instructions for next treatment:    Treatment Charges: Mins Units   []  Modalities     [x]  Ther Exercise 50 3   []  Manual Therapy     []  Ther Activities     []  Aquatics     [x]  Vasocompression 10 1   []  Other     Total Treatment time 48 3       Assessment: [x] Progressing toward goals. Pt demos improving PROM of L knee 0-125 deg, however pt cont to lack approx 4 deg ext without overpressure. Pt continued with mat exercises with fair tolerance, pt demos difficulty with quad sets d/t pain in posterior knee. Improved julia with prone quad sets. Added DL squats on total gym with good tolerance. Pt reports more \"pressure\" in L knee with total gym squats and TRX squats, but able to complete.  Pt dmeos improved tolerance and balance with gait training drills. Ended with vaso for pain management. [] No change. [] Other:  [x] Patient would continue to benefit from skilled physical therapy services in order to: The patient is a 40year old with a chief complaint of left knee pain and signs and symptoms consistent with a diagnosis of left patellar contusion. She presents with pain, weakness, decreased range of motion and functional limitations. She will benefit from skilled PT to address above deficits. STG/LTG  STG: (to be met in 5 treatments)  ? Pain: 2/10. Met  ? ROM: 0-135:  Patient to be independent with home exercise program as demonstrated by performance with correct form without cues. Met     LTG: (to be met in 10 treatments)  Patient will ascend and descend stairs without increase in pain. Patient will normalize gait. Patient will resume walking program.                 Patient goals: Reduce knee pain    Pt. Education:  [x] Yes  [] No  [x] Reviewed Prior HEP/Ed  Method of Education: [x] Verbal  [] Demo  [x] Written  Comprehension of Education:  [x] Verbalizes understanding. [] Demonstrates understanding. [x] Needs review. [] Demonstrates/verbalizes HEP/Ed previously given. Plan: [x] Continue current frequency toward long and short term goals.     [] Specific Instructions for subsequent treatments:     Frequency:  2 x/week for 10 visits      Time In: 11:00am           Time Out: 12:00pm    Electronically signed by:  Evans Triplett PTA

## 2022-12-08 ENCOUNTER — HOSPITAL ENCOUNTER (OUTPATIENT)
Dept: PHYSICAL THERAPY | Facility: CLINIC | Age: 42
Setting detail: THERAPIES SERIES
Discharge: HOME | End: 2022-12-08

## 2022-12-08 PROCEDURE — 97110 THERAPEUTIC EXERCISES: CPT

## 2022-12-08 NOTE — FLOWSHEET NOTE
[] [facility]) - [facility] &  Therapy  [address]  [phone]  F: [phone] [] [address]  [address]   [address]  P: [phone]  F: [phone] [] [place]  [facility]  [address]  P: [phone]  F: [phone] [x] [address]  P: [phone]  F: [phone] [] [address]  [facility]   [address]   [place]: [phone]  F: [phone]      Physical Therapy Daily Treatment Note    Date:  2022  Patient Name:  Lester Cole    :  1980  MRN: [id number]  Physician: Josi Philip PA-C                                Insurance: Hospital Self Pay  Medical Diagnosis: S80.02XA (ICD-10-CM) - Contusion of left patella, initial encounter                Rehab Codes: Left knee pain M25.562  Onset date: 22             Next Dr's appt.: Pending    Visit# / total visits: 5/10    Cancels/No Shows: 0/0    Subjective:    Pain:  [x] Yes  [] No  Location: L Knee   Pain Rating: (0-10 scale) 2/10  Pain altered Tx:  [x] No  [] Yes  Action:  Comments: Pt reports that her knee is getting more straight. Her pain continues to improve and is variable depending on activity.       Objective:  Modalities:    Precautions:none  Exercises:  Exercise Reps/ Time Weight/ Level Comments    Bike 8' Seat 3  x   Gastroc str 2x30\"   x   HS str  2x30\"   x   Heel slides 15x      Ball Bridge 2x15   x   Ball flexion  2x15   x   Ball alt SLR 2x10   x          Prone quad str  3x30\"      Prone Quad set 15x  3\" hold    x   LAQ 2x10  With slight HS curl  -   Clam 15   x   SLR x10     -   Hip abduction x10     x   Hip circles x10 ea    x   Prone extension x10     x   Bridges 2x15     -   Gym       Heel prop 1'  Seated in chair, heel on stool    Total Gym squats- DL  3x15 L13  x   Gait training  1 laps       Heel pause 20'      Toe pause  20'      Marching 20'      TKE 2x15 Blue  -   Calf raise 2x15   x   TRX squat 3x10   x   Lateral band walk 3x20' lime  x   Step up 3x10 4\"  x   S/L RDL 3x10 ea  To step x   Other:   11/22/22 12/5/22   L knee PROM  5-120 0-125; lacking 4 deg ext at rest      HEP:   Access Code: [email or web address]  URL: "[email or web address]/  Date: 11/28/2022  Prepared by: Genesis Perales    Exercises  Prone Quad Stretch with Towel Roll and Strap - 1 x daily - 7 x weekly - 1 sets - 3 reps - 30 sec hold  Supine Quad Set - 1 x daily - 7 x weekly - 2-3 sets - 10 reps  Active Straight Leg Raise with Quad Set - 1 x daily - 7 x weekly - 2-3 sets - 10 reps  Sidelying Hip Abduction - 1 x daily - 7 x weekly - 2-3 sets - 10 reps  Prone Hip Extension - 1 x daily - 7 x weekly - 2-3 sets - 10 reps  Seated Long Arc Quad - 1 x daily - 7 x weekly - 2-3 sets - 10 reps  Supine Heel Slide with Strap - 1 x daily - 7 x weekly - 2-3 sets - 10 reps    Heel prop x1' - verbal instruction    Specific Instructions for next treatment:    Treatment Charges: Mins Units   [x]  Modalities:Cold 10    [x]  Ther Exercise 50 3   []  Manual Therapy     []  Ther Activities     []  Aquatics     [x]  Vasocompression     []  Other     Total Treatment time 60 3       Assessment: [x] Progressing toward goals. Pt demos improving ROM and gait. She was able to do all supine and side lying exercises without pain. She is able to achieve full active extension with good quad set. Able to progress sets and repetitions of closed chain exercisers and introduce single leg strengthening including a 4\" step up and S/L RDL. She will continue next week and determine progress for discharge planning. [] No change. [] Other:  [x] Patient would continue to benefit from skilled physical therapy services in order to:  The patient is a 40year old with a chief complaint of left knee pain and signs and symptoms consistent with a diagnosis of left patellar contusion. She presents with pain, weakness, decreased range of motion and functional limitations. She will benefit from skilled PT to address above deficits. STG/LTG  STG: (to be met in 5 treatments)  ? Pain: 2/10. Met  ? ROM: 0-135. Patient to be independent with home exercise program as demonstrated by performance with correct form without cues. Met     LTG: (to be met in 10 treatments)  Patient will ascend and descend stairs without increase in pain. Patient will normalize gait. Patient will resume walking program.                 Patient goals: Reduce knee pain    Pt. Education:  [x] Yes  [] No  [x] Reviewed Prior HEP/Ed  Method of Education: [x] Verbal  [] Demo  [x] Written  Comprehension of Education:  [x] Verbalizes understanding. [] Demonstrates understanding. [x] Needs review. [] Demonstrates/verbalizes HEP/Ed previously given. Plan: [x] Continue current frequency toward long and short term goals. [x] Specific Instructions for subsequent treatments:  Progress closed chain strength as tolerated.     Frequency:  2 x/week for 10 visits      Time In: 10:00am           Time Out: 11:00am    Electronically signed by:  Cabrera Alan, PT

## 2022-12-12 ENCOUNTER — HOSPITAL ENCOUNTER (OUTPATIENT)
Dept: PHYSICAL THERAPY | Facility: CLINIC | Age: 42
Setting detail: THERAPIES SERIES
Discharge: HOME | End: 2022-12-12

## 2022-12-12 PROCEDURE — 97110 THERAPEUTIC EXERCISES: CPT

## 2022-12-12 NOTE — FLOWSHEET NOTE
[] [facility]) [facility] CENTER &  Therapy  [address]  [phone]  F: [phone] [] [address]  [address]   [address]  P: [phone]  F: [phone] [] [place]  [facility]  [address]  P: [phone]  F: [phone] [x] [address]  P: [phone]  F: [phone] [] [address]  [facility]   [address]   [place]: [phone]  F: [phone]      Physical Therapy Daily Treatment Note    Date:  2022  Patient Name:  Kristen Tolliver    :  1980  MRN: [id number]  Physician: Marsha Rizo PA-C                                Insurance: Hospital Self Pay  Medical Diagnosis: S80.02XA (ICD-10-CM) - Contusion of left patella, initial encounter                Rehab Codes: Left knee pain M25.562  Onset date: 22             Next 's appt.: Pending    Visit# / total visits: 6/10    Cancels/No Shows: 0/0    Subjective:    Pain:  [x] Yes  [] No  Location: L Knee   Pain Rating: (0-10 scale) 2/10  Pain altered Tx:  [x] No  [] Yes  Action:  Comments: Pt arrives stating she was pretty sore after last session however is feeling painfree today. Able to flex the knee better and has been working on going up and down stairs. Descending is more challenging. Notes she feels better overall and would like to be discharged after today.        Objective:  Modalities:    Precautions:none  Exercises:  Exercise Reps/ Time Weight/ Level Comments    Bike 8' Seat 3  x   Gastroc str 2x30\"   x   HS str  2x30\"   x   Heel slides 15x      Ball Bridge 2x15   x   Ball flexion  2x15   x   Ball alt SLR 2x10   x          Prone quad str  3x30\"      Prone Quad set 15x  3\" hold    x   LAQ 2x10  With slight HS curl  -   Clam 15   x   SLR x10     -   Hip abduction x10     x   Hip circles x10 ea    x   Prone extension 2x10     x   Bridges 2x15     -   Gym       Heel prop 1'  Seated in chair, heel on stool    Total Gym squats- DL  3x15 L13  x   Gait training  1 laps       Heel pause 20'      Toe pause  20'      Marching 20'      TKE 2x15 Blue  -   Calf raise 2x15   x   TRX squat 3x10   x   Lateral band walk 3x20' lime  x   Step up 3x10 4\"  x   S/L RDL 3x10 ea  To step    Other:   11/22/22 12/5/22   L knee PROM  5-120 0-125; lacking 4 deg ext at rest      HEP:   Access Code: [email or web address]  URL: [email or web address]  Date: 11/28/2022  Prepared by: Remedios Orosco    Exercises  Prone Quad Stretch with Towel Roll and Strap - 1 x daily - 7 x weekly - 1 sets - 3 reps - 30 sec hold  Supine Quad Set - 1 x daily - 7 x weekly - 2-3 sets - 10 reps  Active Straight Leg Raise with Quad Set - 1 x daily - 7 x weekly - 2-3 sets - 10 reps  Sidelying Hip Abduction - 1 x daily - 7 x weekly - 2-3 sets - 10 reps  Prone Hip Extension - 1 x daily - 7 x weekly - 2-3 sets - 10 reps  Seated Long Arc Quad - 1 x daily - 7 x weekly - 2-3 sets - 10 reps  Supine Heel Slide with Strap - 1 x daily - 7 x weekly - 2-3 sets - 10 reps    Heel prop x1' - verbal instruction    Specific Instructions for next treatment:    Treatment Charges: Mins Units   []  Modalities:Cold     [x]  Ther Exercise 50 3   []  Manual Therapy     []  Ther Activities     []  Aquatics     [x]  Vasocompression     []  Other     Total Treatment time 50 3       Assessment: [x] Progressing toward goals. Focused today's treatment on exercise review to ensure proper technique and understanding of HEP. Fatigued with lateral band walks and supine ball ex however denies any pain. Held SL RDL as pt reported she was \"very sore\" from that ex last time. Good tolerance to step ups, able to increase functional tolerance at home for doing laundry. Pt stated she is equipped with HEP handouts and bands to continue independently.  Emphasized staying consistent to further advance ROM and strength. Denies CP this date. Pt demos improving ROM and gait. She was able to do all supine and side lying exercises without pain. She is able to achieve full active extension with good quad set. Able to progress sets and repetitions of closed chain exercisers and introduce single leg strengthening including a 4\" step up and S/L RDL. She will continue next week and determine progress for discharge planning. [] No change. [] Other:  [x] Patient would continue to benefit from skilled physical therapy services in order to: The patient is a [address] old with a chief complaint of left knee pain and signs and symptoms consistent with a diagnosis of left patellar contusion. She presents with pain, weakness, decreased range of motion and functional limitations. She will benefit from skilled PT to address above deficits. STG/LTG  STG: (to be met in 5 treatments)  ? Pain: 2/10. Met  ? ROM: 0-135. Patient to be independent with home exercise program as demonstrated by performance with correct form without cues. Met     LTG: (to be met in 10 treatments)  Patient will ascend and descend stairs without increase in pain. Patient will normalize gait. Patient will resume walking program.                 Patient goals: Reduce knee pain    Pt. Education:  [x] Yes  [] No  [x] Reviewed Prior HEP/Ed  Method of Education: [x] Verbal  [] Demo  [x] Written  Comprehension of Education:  [x] Verbalizes understanding. [] Demonstrates understanding. [x] Needs review. [] Demonstrates/verbalizes HEP/Ed previously given. Plan: [x] Continue current frequency toward long and short term goals. [x] Specific Instructions for subsequent treatments:  Pt to be discharged at this time. Frequency:  2 x/week for 10 visits      Time In: 1:35 PM           Time Out: 2:35 PM    Electronically signed by:   Chanel Menchaca

## 2022-12-15 ENCOUNTER — HOSPITAL ENCOUNTER (OUTPATIENT)
Dept: PHYSICAL THERAPY | Facility: CLINIC | Age: 42
Setting detail: THERAPIES SERIES
End: 2022-12-15

## 2022-12-19 NOTE — DISCHARGE SUMMARY
[] [facility]) [facility] CENTER &  Therapy  [address]  [phone]  F: [phone] [] [address]  [address]   [address]  P: [phone]  F: [phone] [] [address]  [address]  P: [phone]  F: [phone] [x] [address]  P: [phone]  F: [phone] [] [address]  [facility]   [address]   [place]: [phone]  F: [phone]      Physical Therapy Discharge Note    Date: 2022      Patient: Dylan Crowley  : 1980  MRN: [id number]    Physician: Aldona Primrose, PA-C                                Insurance: Hospital Self Pay  Medical Diagnosis: S80.02XA (ICD-10-CM) - Contusion of left patella, initial encounter                Rehab Codes: Left knee pain M25.562  Onset date: 22             Next Dr's appt.: Pending     Visit# / total visits: 6/10                      Cancels/No Shows: 0/0  Date of initial visit: 22                Date of final visit: 22      Subjective:  Pain:  [x] Yes  [] No               Location: L Knee                     Pain Rating: (0-10 scale) 2/10  Pain altered Tx:  [x] No  [] Yes  Action:  Comments: Pt arrives stating she was pretty sore after last session however is feeling painfree today. Able to flex the knee better and has been working on going up and down stairs. Descending is more challenging. Notes she feels better overall and would like to be discharged after today. Objective:  Test Measurements: AROM 0-135  Function: Normalized gait, LEFS 71/80 12% functional limitation. Assessment:  [x] Progressing toward goals. Focused today's treatment on exercise review to ensure proper technique and understanding of HEP.  Fatigued with lateral band walks and supine ball ex however denies any pain. Held SL RDL as pt reported she was \"very sore\" from that ex last time. Good tolerance to step ups, able to increase functional tolerance at home for doing laundry. Pt stated she is equipped with HEP handouts and bands to continue independently. Emphasized staying consistent to further advance ROM and strength. Denies CP this date. Pt demos improving ROM and gait. She was able to do all supine and side lying exercises without pain. She is able to achieve full active extension with good quad set. Able to progress sets and repetitions of closed chain exercisers and introduce single leg strengthening including a 4\" step up and S/L RDL. She will continue next week and determine progress for discharge planning. [] No change. [] Other:  [x] Patient would continue to benefit from skilled physical therapy services in order to: The patient is a 40year old with a chief complaint of left knee pain and signs and symptoms consistent with a diagnosis of left patellar contusion. She presents with pain, weakness, decreased range of motion and functional limitations. She will benefit from skilled PT to address above deficits. STG/LTG  STG: (to be met in 5 treatments)  ? Pain: 2/10. Met  ? ROM: 0-135. Met  Patient to be independent with home exercise program as demonstrated by performance with correct form without cues. Met     LTG: (to be met in 10 treatments)  Patient will ascend and descend stairs without increase in pain. Met  Patient will normalize gait.  Met  Patient will resume walking program. Met    Treatment to Date:  [x] Therapeutic Exercise    [] Modalities:  [x] Therapeutic Activity    [] Ultrasound  [] Electrical Stimulation  [] Gait Training     [] Massage       [] Lumbar/Cervical Traction  [x] Neuromuscular Re-education [] Cold/hotpack [] Iontophoresis: 4 mg/mL  [x] Instruction in Home Exercise Program Dexamethasone Sodium  [] Manual Therapy             Phosphate 40-80 mAmin  [] Aquatic Therapy                   [x] Vasocompression/    [] Other:             Game Ready    Discharge Status:     [x] Pt recovered from conditions. Treatment goals were met. [] Pt received maximum benefit. No further therapy indicated at this time. [] Pt to continue exercise/home instructions independently. [] Therapy interrupted due to:    [] Pt has 2 or more no shows/cancels, is discontinued per our policy. [] Pt has completed prescribed number of treatment sessions. [] Other:         Electronically signed by Breonna Smith PT on 12/19/2022 at 3:25 PM      If you have any questions or concerns, please don't hesitate to call.   Thank you for your referral.

## 2022-12-23 NOTE — PROGRESS NOTES
[address] [place] [address] AND SPORTS MEDICINE  [facility] Bhupendra Lopez  [address] [place] [address]  Dept: [phone]  Dept Fax: [phone]        Ambulatory Follow Up      Subjective:   Roger Li is a 40y.o. year old female who presents to our office today for routine followup regarding her   1. Chondromalacia of knee, left    2. Contusion of left patella, subsequent encounter    . Chief Complaint   Patient presents with    Knee Pain     L Knee f/u doi 11/9/22       Date of Injury/surgery: 11/9/2022    HPI Roger Li  is a 40 y.o.  female who presents today in follow for left knee pain. The patient was last seen on 11/16/2022 and underwent treatment in the form of a prescription for physical therapy and to follow the NSAID protocol. She was also given a Tubigrip for compression. Patient has completed physical therapy. The patient notes 90% improvement with the previous treatment. Review of Systems   Constitutional:  Positive for activity change. Negative for appetite change, fatigue and fever. Respiratory: Negative. Negative for apnea, cough, chest tightness and shortness of breath. Cardiovascular: Negative. Negative for chest pain, palpitations and leg swelling. Gastrointestinal:  Negative for abdominal distention, abdominal pain, constipation, diarrhea, nausea and vomiting. Genitourinary:  Negative for difficulty urinating, dysuria and hematuria. Musculoskeletal:  Positive for arthralgias. Negative for gait problem, joint swelling and myalgias. Skin:  Negative for color change and rash. Neurological:  Negative for dizziness, weakness, numbness and headaches. Psychiatric/Behavioral:  Negative for sleep disturbance. Objective :   Resp 12   Ht 5' 8\" (1.727 m)   Wt 125 lb (56.7 kg)   BMI 19.01 kg/m²  Body mass index is 19.01 kg/m².   General: Roger Li is a 40 y.o. female who is alert and oriented and sitting comfortably in our office. Orthopedics:     GENERAL: Alert and oriented X3 in no acute distress. SKIN: Intact without lesions or ulcerations. NEURO: Intact to sensory and motor testing. VASC: Capillary refill is less than 3 seconds. KNEE EXAM     LOCATION: Left Knee  GEN: Alert and oriented X 3, in no acute distress. GAIT: The patient's gait was observed while entering the exam room and was noted to be non antalgic. The extremity is in anatomic alignment. SKIN: Intact without rashes, lesions, or ulcerations. No obvious deformity or swelling. NEURO: The patient responds to light touch throughout bilateral LE. Patellar and Achilles reflexes are 2/4. VASC: The bilateral LE is neurovascularly intact with 2/4 DP and 2/4 PT pulses. Brisk capillary refill. ROM: 0/135 degrees. There is no effusion. MUSC: slightly decreased quad tone  LIGAMENT: Lachman's test is Negative with Good endpoint. Anterior drawer Negative. Posterior drawer Negative. There is No varus instability at 0 degrees and No varus instability at 30 degrees. There is No valgus instability at 0 degrees and No valgus instability at 30 degrees. SPECIAL: Dustin test is negative with no clunks, + crepitation, and + pain. PALP: There is no joint line pain. Patella pain to palpation. Radiology: Previous x-rays reviewed    Procedure:   Regular Knee Injection    Location: Left Knee  Procedure: I discussed in detail the risks, benefits and complications of the corticosteroid injection which included but are not limited to: infection, skin reactions, hot swollen, and anaphylaxis with the patient. Roger Li verbalized understanding and they have agreed to have the corticosteroid injection into the left knee. The patient was placed in the Supine position on the exam table. The superior lateral portal was identified and marked with a ball point pen. The skin was prepped with betadine in a sterile fashion.  Utilizing a GE ultrasound unit with a variable frequency linear transducer and clean technique with sterile gloves, a 3 cc solution containing 2 cc of 1% lidocaine   with 1 cc containing 40 mg of Kenalog was injected. There was no resistance to the injection. The wound was cleansed and a band-aid was placed. the patient tolerated the procedure without difficulty. Adverse reactions to the injection were discussed with the patient including signs of infection (increasing pain, redness, swelling) and the patient was instructed to call immediately if experiencing any of these symptoms. Images of the injection site were recorded throughout the procedure and are saved on the SD card which is stored in the GE ultrasound unit. All images were downloaded and stored in patient's chart. Assessment:      1. Chondromalacia of knee, left    2. Contusion of left patella, subsequent encounter       Plan:   We discussed that she is feeling much better than she was previously. She does still have some anterior knee pain and when she goes up and down stairs. We discussed the natural etiologies of a contusion of the left patella. We discussed the various treatment alternatives including anti-inflammatory medications, physical therapy, injections, further imaging studies and a last resort surgery. We discussed that we could try a cortisone injection to help with some of that achy tooth ache pain. It may take months for her to be able to kneel. The patient states understanding and would like to proceed with a cortisone injection into the left knee to help reduce inflammation and pain. The injection site should never get red, hot, or swollen and if it does the patient will contact our office right away. The patient may experience a increase in soreness the first 24-48 hours due to a cortisone flair and can take anti-inflammatories for a short period of time to reduce that soreness.  The patient should not submerge the injection site in water for a minimum of 24 hours to avoid infection. This means no lakes, pools, ponds, or hot tubs for 24 hours. If the patient is diabetic the injection may increase their blood sugar for up to one week. The patient can do this cortisone injection once every 3 months as needed. If the injections stop working and do not give the patient relief the patient should consider surgical interventions to produce long term relief. She will continue doing the exercises she learned at physical therapy. She will follow-up with me as needed. Follow up:Return in about 6 weeks (around 2/8/2023), or if symptoms worsen or fail to improve. Orders Placed This Encounter   Medications    lidocaine 1 % injection 2 mL    triamcinolone acetonide (KENALOG-40) injection 40 mg           No orders of the defined types were placed in this encounter. This note is created with the assistance of a speech recognition program.  While intending to generate a document that actually reflects the content of the visit, the document can still have some errors including those of syntax and sound a like substitutions which may escape proof reading. In such instances, actual meaning can be extrapolated by contextual diversion.      Electronically signed by Ele Quiroga PA-C on 12/28/2022 at 9:44 AM

## 2022-12-28 ENCOUNTER — OFFICE VISIT (OUTPATIENT)
Dept: ORTHOPEDIC SURGERY | Age: 42
End: 2022-12-28

## 2022-12-28 VITALS — WEIGHT: 125 LBS | BODY MASS INDEX: 18.94 KG/M2 | RESPIRATION RATE: 12 BRPM | HEIGHT: 68 IN

## 2022-12-28 DIAGNOSIS — M94.262 CHONDROMALACIA OF KNEE, LEFT: Primary | ICD-10-CM

## 2022-12-28 DIAGNOSIS — S80.02XD CONTUSION OF LEFT PATELLA, SUBSEQUENT ENCOUNTER: ICD-10-CM

## 2022-12-28 RX ORDER — LIDOCAINE HYDROCHLORIDE 10 MG/ML
2 INJECTION, SOLUTION INFILTRATION; PERINEURAL ONCE
Status: COMPLETED | OUTPATIENT
Start: 2022-12-28 | End: 2022-12-28

## 2022-12-28 RX ORDER — TRIAMCINOLONE ACETONIDE 40 MG/ML
40 INJECTION, SUSPENSION INTRA-ARTICULAR; INTRAMUSCULAR ONCE
Status: COMPLETED | OUTPATIENT
Start: 2022-12-28 | End: 2022-12-28

## 2022-12-28 RX ADMIN — LIDOCAINE HYDROCHLORIDE 2 ML: 10 INJECTION, SOLUTION INFILTRATION; PERINEURAL at 11:29

## 2022-12-28 RX ADMIN — TRIAMCINOLONE ACETONIDE 40 MG: 40 INJECTION, SUSPENSION INTRA-ARTICULAR; INTRAMUSCULAR at 11:29

## 2022-12-28 ASSESSMENT — ENCOUNTER SYMPTOMS
ABDOMINAL DISTENTION: 0
VOMITING: 0
RESPIRATORY NEGATIVE: 1
NAUSEA: 0
COLOR CHANGE: 0
ABDOMINAL PAIN: 0
CONSTIPATION: 0
DIARRHEA: 0
COUGH: 0
SHORTNESS OF BREATH: 0
APNEA: 0
CHEST TIGHTNESS: 0

## 2023-02-06 ENCOUNTER — APPOINTMENT (OUTPATIENT)
Dept: GENERAL RADIOLOGY | Facility: CLINIC | Age: 43
End: 2023-02-06

## 2023-02-06 ENCOUNTER — HOSPITAL ENCOUNTER (EMERGENCY)
Facility: CLINIC | Age: 43
Discharge: HOME | End: 2023-02-06
Attending: EMERGENCY MEDICINE

## 2023-02-06 VITALS
BODY MASS INDEX: 18.94 KG/M2 | SYSTOLIC BLOOD PRESSURE: 123 MMHG | DIASTOLIC BLOOD PRESSURE: 61 MMHG | RESPIRATION RATE: 16 BRPM | HEIGHT: 68 IN | WEIGHT: 125 LBS | TEMPERATURE: 99 F | HEART RATE: 83 BPM | OXYGEN SATURATION: 100 %

## 2023-02-06 DIAGNOSIS — S92.421A CLOSED DISPLACED FRACTURE OF DISTAL PHALANX OF RIGHT GREAT TOE, INITIAL ENCOUNTER: Primary | ICD-10-CM

## 2023-02-06 PROCEDURE — 99283 EMERGENCY DEPT VISIT LOW MDM: CPT

## 2023-02-06 PROCEDURE — 73630 X-RAY EXAM OF FOOT: CPT

## 2023-02-06 RX ORDER — IBUPROFEN 800 MG/1
800 TABLET ORAL EVERY 8 HOURS PRN
Qty: 30 TABLET | Refills: 0 | Status: SHIPPED | OUTPATIENT
Start: 2023-02-06

## 2023-02-06 ASSESSMENT — PAIN DESCRIPTION - DESCRIPTORS: DESCRIPTORS: ACHING

## 2023-02-06 ASSESSMENT — PAIN - FUNCTIONAL ASSESSMENT: PAIN_FUNCTIONAL_ASSESSMENT: 0-10

## 2023-02-06 ASSESSMENT — PAIN DESCRIPTION - LOCATION: LOCATION: TOE (COMMENT WHICH ONE)

## 2023-02-06 ASSESSMENT — PAIN DESCRIPTION - ORIENTATION: ORIENTATION: RIGHT

## 2023-02-06 ASSESSMENT — PAIN SCALES - GENERAL: PAINLEVEL_OUTOF10: 8

## 2023-02-07 NOTE — ED NOTES
Pt presents to ED ambulatory a&o x4. Pt comes with complaints of R big toe pain. Pt states that she kicked something with her steel toe boots and injured toe. Pt states painful and limited ROM. Bruising noted. Sensation intact.       Radha Davis RN  02/06/23 [id number]

## 2023-02-07 NOTE — ED PROVIDER NOTES
eMERGENCY dEPARTMENT eNCOUnter      Pt Name: Donovan Recinos  MRN: [id number]  [place] 1980  Date of evaluation: 2/6/2023      CHIEF COMPLAINT       Chief Complaint   Patient presents with    Toe Injury         HISTORY OF PRESENT ILLNESS    Donovan Recinos is a 44 y.o. female who presents with right toe pain. Patient states for the last several days she has been having pain to the right great toe she wears some type of steel boot and she may have jammed her toe says it just feels like somebody is poking at head she complains of pain to the area no numbness or tingling        REVIEW OF SYSTEMS       Positive right toe pain negative for numbness tingling or loss of function    PAST MEDICAL HISTORY    has a past medical history of Abscess, Ectopic pregnancy, Endometritis, History of broken nose, and MRSA (methicillin resistant staph aureus) culture positive. SURGICAL HISTORY      has a past surgical history that includes Hysterectomy, total abdominal; Ovary removal (Bilateral); Cervix removal; and Breast lumpectomy (Right). CURRENT MEDICATIONS       Discharge Medication List as of 2/6/2023 10:43 PM        CONTINUE these medications which have NOT CHANGED    Details   fexofenadine (ALLEGRA ALLERGY) 180 MG tablet Take 180 mg by mouth as needed             ALLERGIES     is allergic to bactrim [sulfamethoxazole-trimethoprim] and codeine. FAMILY HISTORY     has no family status information on file. family history is not on file. SOCIAL HISTORY      reports that she has been smoking cigarettes. She has a 20.00 pack-year smoking history. She has never used smokeless tobacco. She reports current alcohol use. She reports that she does not currently use drugs after having used the following drugs: Marijuana Gregorio Sim). PHYSICAL EXAM     INITIAL VITALS:  height is 5' 8\" (1.727 m) and weight is 56.7 kg (125 lb). Her oral temperature is 99 °F (37.2 °C). Her blood pressure is 123/61 and her pulse is 83.  Her respiration is 16 and oxygen saturation is 100%. General: Patient alert nontoxic-appearing female in no apparent distress  HEENT: Head is atraumatic  Respiratory: Patient has nonlabored respirations  Extremity: Examination of right lower extremity reveals a very small area of contusion to the plantar surface of the toe no firmness no swelling to the toe is identified she has a trace amount of contusion at the base of the nail full range of motion sensation is intact capillary refill is intact    DIFFERENTIAL DIAGNOSIS/ MDM:     Contusion versus fracture    DIAGNOSTIC RESULTS     EKG: All EKG's are interpreted by the Emergency Department Physician who either signs or Co-signs this chart in the absence of a cardiologist.        RADIOLOGY:   I directly visualized the following  images and reviewed the radiologist interpretations:  XR FOOT RIGHT (MIN 3 VIEWS)   Final Result   Linear lucency in the great toe distal phalanx may represent a nondisplaced   fracture.                LABS:  Labs Reviewed - No data to display      EMERGENCY DEPARTMENT COURSE:   Vitals:    Vitals:    02/06/23 2200   BP: 123/61   Pulse: 83   Resp: 16   Temp: 99 °F (37.2 °C)   TempSrc: Oral   SpO2: 100%   Weight: 56.7 kg (125 lb)   Height: 5' 8\" (1.727 m)     -------------------------  BP: 123/61, Temp: 99 °F (37.2 °C), Heart Rate: 83, Resp: 16    Orders Placed This Encounter   Medications    ibuprofen (ADVIL;MOTRIN) 800 MG tablet     Sig: Take 1 tablet by mouth every 8 hours as needed for Pain     Dispense:  30 tablet     Refill:  0           Re-evaluation Notes    X-ray per radiologist reveals a small lucency which may represent fracture the exam of the patient is not consistent with a fracture as there is no great swelling to the area no great ecchymosis to the area however we will treat her symptomatically for this follow-up as directed return if worse    CRITICAL CARE:   None      CONSULTS:      PROCEDURES:  None    FINAL IMPRESSION 1. Closed displaced fracture of distal phalanx of right great toe, initial encounter          DISPOSITION/PLAN   DISPOSITION Decision To Discharge 02/06/2023 10:42:04 PM      Condition on Disposition    Stable    PATIENT REFERRED TO:  [address] [place] [address] [address] [facility]  [phone]      As needed      DISCHARGE MEDICATIONS:  Discharge Medication List as of 2/6/2023 10:43 PM        START taking these medications    Details   ibuprofen (ADVIL;MOTRIN) 800 MG tablet Take 1 tablet by mouth every 8 hours as needed for Pain, Disp-30 tablet, R-0Normal             (Please note that portions of this note were completed with a voice recognition program.  Efforts were made to edit the dictations but occasionally words are mis-transcribed.)    Delfino Victor MD,, MD, F.A.C.E.P.   Attending Emergency Physician        Delfino Victor MD  02/07/23 0908

## 2024-05-09 NOTE — FLOWSHEET NOTE
[] [facility]) [facility] CENTER &  Therapy  [address]  [phone]  F: [phone] [] [address]  [address]   [address]  P: [phone]  F: [phone] [] [address]  Therapy  [address]  P: [phone]  F: [phone] [x] [address]  P: [phone]  F: [phone] [] [address]  [facility]   [address]   [place]: [phone]  F: [phone]      Physical Therapy Daily Treatment Note    Date:  2022  Patient Name:  Elizabeth Nieves    :  1980  MRN: [id number]  Physician: Carrie Perry PA-C                                Insurance: Hospital Self Pay  Medical Diagnosis: S80.02XA (ICD-10-CM) - Contusion of left patella, initial encounter                Rehab Codes: Left knee pain M25.562  Onset date: 22             Next Dr's appt.: Pending    Visit# / total visits: 2/10    Cancels/No Shows: 0/0    Subjective:    Pain:  [x] Yes  [] No  Location: L Knee   Pain Rating: (0-10 scale) 3/10  Pain altered Tx:  [x] No  [] Yes  Action:  Comments: Pt states she was able to take it easy over the weekend with slight decreased pain. However pt amb into clinic with antalgic gait.      Objective:  Modalities:  ice pack with elevation 10'  Precautions:  Exercises:  Exercise Reps/ Time Weight/ Level Comments    Nustep  6' L2  x   Gastroc str 2x30\"   x   HS str  2x30\"   x   Heel slides 2x10   x   Prone quad str  3x30\"   x   Quad set 2x10  3\" hold    x   LAQ 2x10  With slight HS curl  x   SLR 10x     x   Hip abduction 2x10     x   Prone extension x10     x   Bridges 2x10     x   Gym       Heel prop 1'  Seated in chair, heel on stool x   Gait training  3 laps    x   Heel pause 20'   x   Toe pause  20'   x   Other:  HEP:   Access Code: [email or web address]  URL: [email or web address]  Date: 11/28/2022  Prepared by: Stas Martins    Exercises  Prone Quad Stretch with Towel Roll and Strap - 1 x daily - 7 x weekly - 1 sets - 3 reps - 30 sec hold  Supine Quad Set - 1 x daily - 7 x weekly - 2-3 sets - 10 reps  Active Straight Leg Raise with Quad Set - 1 x daily - 7 x weekly - 2-3 sets - 10 reps  Sidelying Hip Abduction - 1 x daily - 7 x weekly - 2-3 sets - 10 reps  Prone Hip Extension - 1 x daily - 7 x weekly - 2-3 sets - 10 reps  Seated Long Arc Quad - 1 x daily - 7 x weekly - 2-3 sets - 10 reps  Supine Heel Slide with Strap - 1 x daily - 7 x weekly - 2-3 sets - 10 reps    Heel prop x1' - verbal instruction    Specific Instructions for next treatment:    Treatment Charges: Mins Units   []  Modalities     [x]  Ther Exercise 50 3   []  Manual Therapy     []  Ther Activities     []  Aquatics     [x]  Vasocompression 10 1   []  Other     Total Treatment time 60 4       Assessment: [x] Progressing toward goals. Initiated tx on Nustep with fair tolerance. Pt c/o pain in posterior knee and anterior knee with flexion. AROM 5-116 deg, able to reach 0-120 deg at end of session. Added heel slides along with quad, HS, and gastroc stretches with fair to good tolerance. Good julia with LAQ, however c/o slight incr pain with HS curl at the end of LAQ. Pt reports decreased tightness after exercises. PTA also introduced gait training with heel pause and toe pause walking. Improved TKE with heel strike on L after instructions. Ended with vaso for pain management. [] No change. [] Other:  [x] Patient would continue to benefit from skilled physical therapy services in order to: The patient is a 40year old with a chief complaint of left knee pain and signs and symptoms consistent with a diagnosis of left patellar contusion. She presents with pain, weakness, decreased range of motion and functional limitations.   She will benefit from skilled PT to address above deficits. STG/LTG  STG: (to be met in 5 treatments)  ? Pain: 2/10  ? ROM: 0-135:  Patient to be independent with home exercise program as demonstrated by performance with correct form without cues. LTG: (to be met in 10 treatments)  Patient will ascend and descend stairs without increase in pain. Patient will normalize gait. Patient will resume walking program.                 Patient goals: Reduce knee pain    Pt. Education:  [x] Yes  [] No  [x] Reviewed Prior HEP/Ed  Method of Education: [x] Verbal  [] Demo  [x] Written  Comprehension of Education:  [x] Verbalizes understanding. [] Demonstrates understanding. [x] Needs review. [] Demonstrates/verbalizes HEP/Ed previously given. Plan: [x] Continue current frequency toward long and short term goals.     [] Specific Instructions for subsequent treatments:     Frequency:  2 x/week for 10 visits      Time In:10:05am            Time Out: 11:05am    Electronically signed by:  Gladis Johnson PTA Detail Level: Detailed Initiate Treatment: Suggested options of: Cerave/amlactin/eucerin/cetaphil cream daily after showers. Gentle cleansers discussed like cetaphil vs vanicream. Basic skin care reviewed. Suggested increasing water intake as well. Render In Strict Bullet Format?: No